# Patient Record
Sex: FEMALE | Race: WHITE | URBAN - METROPOLITAN AREA
[De-identification: names, ages, dates, MRNs, and addresses within clinical notes are randomized per-mention and may not be internally consistent; named-entity substitution may affect disease eponyms.]

---

## 2019-05-24 NOTE — PATIENT DISCUSSION
Advancing VF defect OU. Follow Dr. Lucero Metzger. Recommend stopping or reducing plaquenil due to visual findings.

## 2020-07-06 ENCOUNTER — IMPORTED ENCOUNTER (OUTPATIENT)
Dept: URBAN - METROPOLITAN AREA CLINIC 50 | Facility: CLINIC | Age: 75
End: 2020-07-06

## 2021-04-17 ASSESSMENT — VISUAL ACUITY
OD_CC: 20/20-1
OS_BAT: 20/25
OD_OTHER: 20/25. 20/40-1.
OD_CC: J1+
OD_BAT: 20/25
OS_CC: J1+
OS_CC: 20/20-1
OS_OTHER: 20/25. 20/30-2.

## 2021-04-17 ASSESSMENT — TONOMETRY
OS_IOP_MMHG: 16
OD_IOP_MMHG: 16

## 2021-12-20 ENCOUNTER — PREPPED CHART (OUTPATIENT)
Dept: URBAN - METROPOLITAN AREA CLINIC 52 | Facility: CLINIC | Age: 76
End: 2021-12-20

## 2021-12-27 ENCOUNTER — COMPREHENSIVE EXAM (OUTPATIENT)
Dept: URBAN - METROPOLITAN AREA CLINIC 52 | Facility: CLINIC | Age: 76
End: 2021-12-27

## 2021-12-27 DIAGNOSIS — H35.373: ICD-10-CM

## 2021-12-27 DIAGNOSIS — H35.013: ICD-10-CM

## 2021-12-27 DIAGNOSIS — H02.834: ICD-10-CM

## 2021-12-27 DIAGNOSIS — R73.03: ICD-10-CM

## 2021-12-27 DIAGNOSIS — H02.831: ICD-10-CM

## 2021-12-27 DIAGNOSIS — H04.123: ICD-10-CM

## 2021-12-27 DIAGNOSIS — L71.9: ICD-10-CM

## 2021-12-27 DIAGNOSIS — H26.493: ICD-10-CM

## 2021-12-27 PROCEDURE — 92134 CPTRZ OPH DX IMG PST SGM RTA: CPT

## 2021-12-27 PROCEDURE — 92014 COMPRE OPH EXAM EST PT 1/>: CPT

## 2021-12-27 PROCEDURE — 92015 DETERMINE REFRACTIVE STATE: CPT

## 2021-12-27 ASSESSMENT — VISUAL ACUITY
OD_CC: 20/30
OU_CC: J1+
OU_CC: 20/25
OD_GLARE: 20/30
OS_GLARE: 20/50
OD_GLARE: 20/40
OS_CC: 20/25
OS_GLARE: 20/40

## 2021-12-27 ASSESSMENT — TONOMETRY
OD_IOP_MMHG: 16
OS_IOP_MMHG: 17

## 2024-02-12 ENCOUNTER — COMPREHENSIVE EXAM (OUTPATIENT)
Dept: URBAN - METROPOLITAN AREA CLINIC 52 | Facility: CLINIC | Age: 79
End: 2024-02-12

## 2024-02-12 DIAGNOSIS — H35.013: ICD-10-CM

## 2024-02-12 DIAGNOSIS — H02.834: ICD-10-CM

## 2024-02-12 DIAGNOSIS — R73.09: ICD-10-CM

## 2024-02-12 DIAGNOSIS — H02.831: ICD-10-CM

## 2024-02-12 DIAGNOSIS — L71.9: ICD-10-CM

## 2024-02-12 DIAGNOSIS — H26.493: ICD-10-CM

## 2024-02-12 DIAGNOSIS — H04.123: ICD-10-CM

## 2024-02-12 DIAGNOSIS — H52.4: ICD-10-CM

## 2024-02-12 DIAGNOSIS — H35.373: ICD-10-CM

## 2024-02-12 PROCEDURE — 92015 DETERMINE REFRACTIVE STATE: CPT

## 2024-02-12 PROCEDURE — 99214 OFFICE O/P EST MOD 30 MIN: CPT

## 2024-02-12 PROCEDURE — 92134 CPTRZ OPH DX IMG PST SGM RTA: CPT

## 2024-02-12 ASSESSMENT — VISUAL ACUITY
OD_GLARE: 20/30-2
OU_CC: J1+
OS_GLARE: 20/40
OS_CC: 20/30-1
OS_PH: 20/25-1
OS_GLARE: 20/25-1
OD_CC: 20/25-1
OD_GLARE: 20/25-1

## 2024-02-12 ASSESSMENT — TONOMETRY
OS_IOP_MMHG: 17
OD_IOP_MMHG: 16

## 2025-08-13 ENCOUNTER — COMPREHENSIVE EXAM (OUTPATIENT)
Age: 80
End: 2025-08-13

## 2025-08-13 DIAGNOSIS — H35.013: ICD-10-CM

## 2025-08-13 DIAGNOSIS — H02.834: ICD-10-CM

## 2025-08-13 DIAGNOSIS — L71.9: ICD-10-CM

## 2025-08-13 DIAGNOSIS — H04.123: ICD-10-CM

## 2025-08-13 DIAGNOSIS — H26.493: ICD-10-CM

## 2025-08-13 DIAGNOSIS — R73.09: ICD-10-CM

## 2025-08-13 DIAGNOSIS — H52.4: ICD-10-CM

## 2025-08-13 DIAGNOSIS — H02.831: ICD-10-CM

## 2025-08-13 PROCEDURE — 99214 OFFICE O/P EST MOD 30 MIN: CPT

## 2025-08-13 PROCEDURE — 92134 CPTRZ OPH DX IMG PST SGM RTA: CPT | Mod: NC
